# Patient Record
Sex: FEMALE | Race: WHITE | NOT HISPANIC OR LATINO | Employment: FULL TIME | URBAN - METROPOLITAN AREA
[De-identification: names, ages, dates, MRNs, and addresses within clinical notes are randomized per-mention and may not be internally consistent; named-entity substitution may affect disease eponyms.]

---

## 2017-11-07 ENCOUNTER — TELEPHONE (OUTPATIENT)
Dept: OBSTETRICS AND GYNECOLOGY | Facility: CLINIC | Age: 52
End: 2017-11-07

## 2017-11-07 NOTE — TELEPHONE ENCOUNTER
Pt called to make a new pt appt. States that she was referred to Dr. Velázquez by her friend Loly Carlisle, who is an ultrasound tech.   Pt states that it has been several years since she has had a pap and a mammogram. Pt states that she has in the past had a lumpectomy and that for the past couple of months she has been having pains in that breast near the site and that there is a lump there as well but it has always been there since lumpectomy.   Pt was given first available new pt appt for 01/31/2018.   I advised pt that I would send a note to  to see if this was ok or needed a sooner appt.  Pt was very appreciative of that.  Please advise. thanks

## 2018-01-31 ENCOUNTER — OFFICE VISIT (OUTPATIENT)
Dept: OBSTETRICS AND GYNECOLOGY | Age: 53
End: 2018-01-31

## 2018-01-31 VITALS
DIASTOLIC BLOOD PRESSURE: 72 MMHG | BODY MASS INDEX: 24.52 KG/M2 | SYSTOLIC BLOOD PRESSURE: 108 MMHG | HEIGHT: 68 IN | WEIGHT: 161.8 LBS

## 2018-01-31 DIAGNOSIS — Z12.4 SCREENING FOR MALIGNANT NEOPLASM OF CERVIX: ICD-10-CM

## 2018-01-31 DIAGNOSIS — N95.2 VAGINAL ATROPHY: ICD-10-CM

## 2018-01-31 DIAGNOSIS — Z13.89 SCREENING FOR BLOOD OR PROTEIN IN URINE: ICD-10-CM

## 2018-01-31 DIAGNOSIS — Z12.11 SCREENING FOR COLON CANCER: ICD-10-CM

## 2018-01-31 DIAGNOSIS — Z01.411 ENCOUNTER FOR GYNECOLOGICAL EXAMINATION WITH ABNORMAL FINDING: Primary | ICD-10-CM

## 2018-01-31 DIAGNOSIS — N63.11 BREAST LUMP ON RIGHT SIDE AT 11 O'CLOCK POSITION: ICD-10-CM

## 2018-01-31 LAB
BILIRUB BLD-MCNC: NEGATIVE MG/DL
CLARITY, POC: CLEAR
COLOR UR: YELLOW
GLUCOSE UR STRIP-MCNC: NEGATIVE MG/DL
KETONES UR QL: NEGATIVE
LEUKOCYTE EST, POC: NEGATIVE
NITRITE UR-MCNC: NEGATIVE MG/ML
PH UR: 7 [PH] (ref 5–8)
PROT UR STRIP-MCNC: NEGATIVE MG/DL
RBC # UR STRIP: NEGATIVE /UL
SP GR UR: 1.01 (ref 1–1.03)
UROBILINOGEN UR QL: NORMAL

## 2018-01-31 PROCEDURE — 99213 OFFICE O/P EST LOW 20 MIN: CPT | Performed by: OBSTETRICS & GYNECOLOGY

## 2018-01-31 PROCEDURE — 99386 PREV VISIT NEW AGE 40-64: CPT | Performed by: OBSTETRICS & GYNECOLOGY

## 2018-01-31 PROCEDURE — 81002 URINALYSIS NONAUTO W/O SCOPE: CPT | Performed by: OBSTETRICS & GYNECOLOGY

## 2018-01-31 RX ORDER — ESTRADIOL 0.1 MG/G
1 CREAM VAGINAL 2 TIMES WEEKLY
Qty: 42.5 G | Refills: 12 | Status: SHIPPED | OUTPATIENT
Start: 2018-02-01 | End: 2019-02-04 | Stop reason: CLARIF

## 2018-01-31 NOTE — PROGRESS NOTES
"Jorge العلي is a 52 y.o. female pt here for annual - describes pain and mass in right breast - went through menopause at age 48 - no HRT  - - not currently SA     History of Present Illness    The following portions of the patient's history were reviewed and updated as appropriate: allergies, current medications, past family history, past medical history, past social history, past surgical history and problem list.    Review of Systems   Constitutional: Negative for chills, fatigue and fever.   Gastrointestinal: Negative for abdominal distention and abdominal pain.   Genitourinary: Negative for difficulty urinating, dyspareunia, menstrual problem, pelvic pain, vaginal bleeding, vaginal discharge and vaginal pain.   Musculoskeletal:        Right breast lump    All other systems reviewed and are negative.  /72  Ht 172.7 cm (68\")  Wt 73.4 kg (161 lb 12.8 oz)  BMI 24.6 kg/m2      Objective   Physical Exam   Constitutional: She is oriented to person, place, and time. She appears well-developed and well-nourished.   Neck: Normal range of motion. Neck supple. No thyromegaly present.   Cardiovascular: Normal rate and regular rhythm.    Pulmonary/Chest: Effort normal and breath sounds normal. Right breast exhibits inverted nipple, mass and tenderness. Right breast exhibits no nipple discharge and no skin change. Left breast exhibits no mass, no nipple discharge, no skin change and no tenderness.       2cm mobile nodule vs dense breast tissue vs scar tissue from biopsy    Abdominal: Soft. Bowel sounds are normal. She exhibits no distension. There is no tenderness.   Genitourinary: Vagina normal and uterus normal. Pelvic exam was performed with patient supine. Uterus is not tender. Cervix exhibits no friability. Right adnexum displays no mass and no tenderness. Left adnexum displays no mass and no tenderness. No vaginal discharge found.   Genitourinary Comments: Pale vaginal tissue c/w atrophy  "   Musculoskeletal: Normal range of motion. She exhibits no edema.   Neurological: She is alert and oriented to person, place, and time.   Skin: Skin is warm and dry. No rash noted.   Psychiatric: She has a normal mood and affect. Her behavior is normal.   Nursing note and vitals reviewed.        Assessment/Plan   Jennifer was seen today for establish care and gynecologic exam.    Diagnoses and all orders for this visit:    Encounter for gynecological examination with abnormal finding    Screening for blood or protein in urine  -     POC Urinalysis Dipstick    Screening for malignant neoplasm of cervix  -     IgP, Aptima HPV - ThinPrep Vial, Cervix    Breast lump on right side at 11 o'clock position  -     Mammo Diagnostic Bilateral With CAD; Future    Vaginal atrophy  -     estradiol (ESTRACE VAGINAL) 0.1 MG/GM vaginal cream; Insert 1 g into the vagina 2 (Two) Times a Week.    Screening for colon cancer  -     Ambulatory Referral For Screening Colonoscopy      Counseling was given to patient for the following topics: self-breast exams .

## 2018-02-02 ENCOUNTER — TELEPHONE (OUTPATIENT)
Dept: OBSTETRICS AND GYNECOLOGY | Age: 53
End: 2018-02-02

## 2018-02-02 LAB
CYTOLOGIST CVX/VAG CYTO: NORMAL
CYTOLOGY CVX/VAG DOC THIN PREP: NORMAL
DX ICD CODE: NORMAL
HIV 1 & 2 AB SER-IMP: NORMAL
HPV I/H RISK 4 DNA CVX QL PROBE+SIG AMP: NEGATIVE
OTHER STN SPEC: NORMAL
PATH REPORT.FINAL DX SPEC: NORMAL
STAT OF ADQ CVX/VAG CYTO-IMP: NORMAL

## 2018-02-02 NOTE — TELEPHONE ENCOUNTER
----- Message from Kim Velázquez MD sent at 2/2/2018  3:06 PM EST -----  Please call patient and notify of normal results of pap smear but consistent with vaginal atrophy

## 2018-02-07 ENCOUNTER — HOSPITAL ENCOUNTER (OUTPATIENT)
Dept: ULTRASOUND IMAGING | Facility: HOSPITAL | Age: 53
Discharge: HOME | End: 2018-02-07
Attending: OBSTETRICS & GYNECOLOGY

## 2018-02-07 ENCOUNTER — HOSPITAL ENCOUNTER (OUTPATIENT)
Dept: MAMMOGRAPHY | Facility: HOSPITAL | Age: 53
Discharge: HOME | End: 2018-02-07
Attending: OBSTETRICS & GYNECOLOGY | Admitting: OBSTETRICS & GYNECOLOGY

## 2018-02-07 DIAGNOSIS — N63.11 BREAST LUMP ON RIGHT SIDE AT 11 O'CLOCK POSITION: ICD-10-CM

## 2018-02-07 PROCEDURE — 77066 DX MAMMO INCL CAD BI: CPT

## 2018-02-07 PROCEDURE — 76642 ULTRASOUND BREAST LIMITED: CPT

## 2018-02-09 ENCOUNTER — TELEPHONE (OUTPATIENT)
Dept: OBSTETRICS AND GYNECOLOGY | Age: 53
End: 2018-02-09

## 2018-02-09 DIAGNOSIS — R92.8 BI-RADS CATEGORY 3 MAMMOGRAM RESULT: Primary | ICD-10-CM

## 2018-02-09 NOTE — TELEPHONE ENCOUNTER
----- Message from Kim Velázquez MD sent at 2/9/2018 11:29 AM EST -----  Please call the patient regarding her normal mammogram of left breast and and right breast as probably benign but needs right breast US in 6 months - order in

## 2018-02-09 NOTE — PROGRESS NOTES
Please call the patient regarding her normal mammogram of left breast and and right breast as probably benign but needs right breast US in 6 months - order in

## 2018-08-09 ENCOUNTER — HOSPITAL ENCOUNTER (OUTPATIENT)
Dept: ULTRASOUND IMAGING | Facility: HOSPITAL | Age: 53
Discharge: HOME | End: 2018-08-09
Attending: OBSTETRICS & GYNECOLOGY | Admitting: OBSTETRICS & GYNECOLOGY

## 2018-08-09 DIAGNOSIS — R92.8 BI-RADS CATEGORY 3 MAMMOGRAM RESULT: ICD-10-CM

## 2018-08-09 PROCEDURE — 76642 ULTRASOUND BREAST LIMITED: CPT

## 2018-08-14 ENCOUNTER — TELEPHONE (OUTPATIENT)
Dept: OBSTETRICS AND GYNECOLOGY | Age: 53
End: 2018-08-14

## 2018-08-14 DIAGNOSIS — N63.11 BREAST LUMP ON RIGHT SIDE AT 11 O'CLOCK POSITION: Primary | ICD-10-CM

## 2018-08-14 NOTE — TELEPHONE ENCOUNTER
Called to discuss mgm results - left detailed voicemail regarding right breast biopsy results and need for biopsy

## 2018-10-25 ENCOUNTER — APPOINTMENT (OUTPATIENT)
Dept: ULTRASOUND IMAGING | Facility: HOSPITAL | Age: 53
End: 2018-10-25
Attending: OBSTETRICS & GYNECOLOGY

## 2019-02-04 ENCOUNTER — OFFICE VISIT (OUTPATIENT)
Dept: OBSTETRICS AND GYNECOLOGY | Age: 54
End: 2019-02-04

## 2019-02-04 VITALS
BODY MASS INDEX: 23.95 KG/M2 | DIASTOLIC BLOOD PRESSURE: 70 MMHG | WEIGHT: 158 LBS | SYSTOLIC BLOOD PRESSURE: 110 MMHG | HEIGHT: 68 IN

## 2019-02-04 DIAGNOSIS — Z12.11 ENCOUNTER FOR SCREENING COLONOSCOPY: ICD-10-CM

## 2019-02-04 DIAGNOSIS — N95.2 VAGINAL ATROPHY: ICD-10-CM

## 2019-02-04 DIAGNOSIS — Z12.4 SCREENING FOR MALIGNANT NEOPLASM OF CERVIX: ICD-10-CM

## 2019-02-04 DIAGNOSIS — Z12.31 BREAST CANCER SCREENING BY MAMMOGRAM: ICD-10-CM

## 2019-02-04 DIAGNOSIS — N63.11 LUMP IN UPPER OUTER QUADRANT OF RIGHT BREAST: ICD-10-CM

## 2019-02-04 DIAGNOSIS — Z01.411 ENCOUNTER FOR GYNECOLOGICAL EXAMINATION WITH ABNORMAL FINDING: Primary | ICD-10-CM

## 2019-02-04 PROBLEM — R92.8 BI-RADS CATEGORY 3 MAMMOGRAM RESULT: Status: RESOLVED | Noted: 2018-02-09 | Resolved: 2019-02-04

## 2019-02-04 PROCEDURE — 99396 PREV VISIT EST AGE 40-64: CPT | Performed by: OBSTETRICS & GYNECOLOGY

## 2019-02-04 NOTE — PROGRESS NOTES
"Jorge العلي is a 53 y.o. female annual visit. last pap 01/31/18 neg / last R breast us 08/09/18 / pt never started estrace due to price - discussed trying imvexxy - would like to try.  Due for mgm/breast US for persistent stable right breast lump at previous lumpectomy site. Did not get colonoscopy ordered last year.       History of Present Illness    The following portions of the patient's history were reviewed and updated as appropriate: allergies, current medications, past family history, past medical history, past social history, past surgical history and problem list.    Review of Systems   Constitutional: Negative for chills and fever.   Gastrointestinal: Negative for abdominal distention and abdominal pain.   Genitourinary: Negative for dyspareunia, dysuria, pelvic pain, vaginal bleeding, vaginal discharge and vaginal pain.   All other systems reviewed and are negative.    /70   Ht 172.7 cm (68\")   Wt 71.7 kg (158 lb)   LMP  (LMP Unknown)   BMI 24.02 kg/m²     Objective   Physical Exam   Constitutional: She is oriented to person, place, and time. She appears well-developed and well-nourished.   Neck: Normal range of motion. Neck supple. No thyromegaly present.   Cardiovascular: Normal rate and regular rhythm.   Pulmonary/Chest: Effort normal and breath sounds normal. Right breast exhibits mass. Right breast exhibits no nipple discharge, no skin change and no tenderness. Left breast exhibits no mass, no nipple discharge, no skin change and no tenderness.   2 cm mobile mass in UOQ right breast        Abdominal: Soft. Bowel sounds are normal. She exhibits no distension. There is no tenderness.   Genitourinary: Uterus normal. Pelvic exam was performed with patient supine. Uterus is not tender. Cervix exhibits no friability. Right adnexum displays no mass and no tenderness. Left adnexum displays no mass and no tenderness. No vaginal discharge found.   Genitourinary Comments: Pale vaginal " tissue c/w atrophy .   Musculoskeletal: Normal range of motion. She exhibits no edema.   Neurological: She is alert and oriented to person, place, and time.   Skin: Skin is warm and dry. No rash noted.   Psychiatric: She has a normal mood and affect. Her behavior is normal.   Nursing note and vitals reviewed.        Assessment/Plan   [place] was seen today for gynecologic exam.    Diagnoses and all orders for this visit:    Encounter for gynecological examination with abnormal finding    Screening for malignant neoplasm of cervix  -     IgP, Aptima HPV    Vaginal atrophy  -     Estradiol (IMVEXXY MAINTENANCE PACK) 10 MCG insert; Insert 1 capsule into the vagina 2 (Two) Times a Week.    Breast cancer screening by mammogram  -     Cancel: Mammo Screening Bilateral With CAD    Lump in upper outer quadrant of right breast  -     US breast right complete; Future  -     Mammo Diagnostic Bilateral With CAD    Encounter for screening colonoscopy  -     Ambulatory Referral For Screening Colonoscopy      Counseling was given to patient for the following topics: self-breast exams .

## 2019-02-06 ENCOUNTER — TELEPHONE (OUTPATIENT)
Dept: OBSTETRICS AND GYNECOLOGY | Age: 54
End: 2019-02-06

## 2019-02-06 NOTE — TELEPHONE ENCOUNTER
----- Message from Kim Velázquez MD sent at 2/6/2019  9:42 AM EST -----  Please call patient and notify of normal results of pap smear

## 2019-02-13 ENCOUNTER — HOSPITAL ENCOUNTER (OUTPATIENT)
Dept: MAMMOGRAPHY | Facility: HOSPITAL | Age: 54
End: 2019-02-13
Attending: OBSTETRICS & GYNECOLOGY

## 2019-02-13 ENCOUNTER — APPOINTMENT (OUTPATIENT)
Dept: ULTRASOUND IMAGING | Facility: HOSPITAL | Age: 54
End: 2019-02-13
Attending: OBSTETRICS & GYNECOLOGY

## 2019-02-25 ENCOUNTER — APPOINTMENT (OUTPATIENT)
Dept: ULTRASOUND IMAGING | Facility: HOSPITAL | Age: 54
End: 2019-02-25
Attending: OBSTETRICS & GYNECOLOGY

## 2019-02-27 ENCOUNTER — HOSPITAL ENCOUNTER (OUTPATIENT)
Dept: ULTRASOUND IMAGING | Facility: HOSPITAL | Age: 54
End: 2019-02-27
Attending: OBSTETRICS & GYNECOLOGY

## 2019-03-07 ENCOUNTER — TELEPHONE (OUTPATIENT)
Dept: OBSTETRICS AND GYNECOLOGY | Age: 54
End: 2019-03-07

## 2019-03-07 NOTE — TELEPHONE ENCOUNTER
SPOKE WITH PT SEVERAL TIMES. ORIGINALLY SHE WAS TO HAVE A R DIAG MAMMO  AND R BREAST U/S DONE BUT SHE DID NOT WANT TO HAVE THE DIAG MAMMO AS SHE DOES NOT LIKE THEM. HAD HER SCHEDULED AT [facility] BUT DR. MARCOS WOULD NOT DO A BREAST U/S ONLY HE STATES PT HAS TO HAVE DIAG W A U/S. PT DECIDED TO GO TO [facility] AS THEY WILL DO A BREAST U/S ONLY. I HAD HER SCHEDULED AT [facility] 2/27/19. CALLED TO F/U AND PT CX THAT  AS IT WAS $1100 FOR THE U/S . PER THE PT SHE WOULD LIKE TO HOLD OFF ON ALL OF THIS. DIAG MAMMO AND U/S OF R BREAST.  NADIA

## 2019-03-29 NOTE — TELEPHONE ENCOUNTER
*LAY* Pt called back to schedule her diag mammo and U/S, she wants to go to [facility], [place] and will set her appt there.

## 2019-05-30 ENCOUNTER — HOSPITAL ENCOUNTER (OUTPATIENT)
Dept: MAMMOGRAPHY | Facility: HOSPITAL | Age: 54
Discharge: HOME | End: 2019-05-30
Admitting: OBSTETRICS & GYNECOLOGY

## 2019-05-30 ENCOUNTER — HOSPITAL ENCOUNTER (OUTPATIENT)
Dept: ULTRASOUND IMAGING | Facility: HOSPITAL | Age: 54
Discharge: HOME | End: 2019-05-30

## 2019-05-30 DIAGNOSIS — N63.11 LUMP IN UPPER OUTER QUADRANT OF RIGHT BREAST: ICD-10-CM

## 2019-05-30 PROCEDURE — 76642 ULTRASOUND BREAST LIMITED: CPT

## 2019-05-30 PROCEDURE — 77066 DX MAMMO INCL CAD BI: CPT

## 2019-06-03 DIAGNOSIS — N63.11 LUMP IN UPPER OUTER QUADRANT OF RIGHT BREAST: Primary | ICD-10-CM

## 2019-11-29 ENCOUNTER — HOSPITAL ENCOUNTER (OUTPATIENT)
Dept: ULTRASOUND IMAGING | Facility: HOSPITAL | Age: 54
Discharge: HOME | End: 2019-11-29
Admitting: OBSTETRICS & GYNECOLOGY

## 2019-11-29 DIAGNOSIS — N63.11 LUMP IN UPPER OUTER QUADRANT OF RIGHT BREAST: ICD-10-CM

## 2019-11-29 PROCEDURE — 76642 ULTRASOUND BREAST LIMITED: CPT

## 2019-12-03 DIAGNOSIS — N63.11 LUMP IN UPPER OUTER QUADRANT OF RIGHT BREAST: Primary | ICD-10-CM

## 2019-12-03 NOTE — PROGRESS NOTES
Please call the patient regarding her abnormal result.Slightly irregular and heterogeneous 9 mm nodule in the 11-o'clock  position of the right breast, stable since the previous ultrasound  studies as described.  2. Additional follow-up ultrasound of this area in approximately 6  months recommended.-

## 2020-04-09 ENCOUNTER — TELEPHONE (OUTPATIENT)
Dept: FAMILY MEDICINE CLINIC | Facility: CLINIC | Age: 55
End: 2020-04-09

## 2020-04-09 ENCOUNTER — OFFICE VISIT (OUTPATIENT)
Dept: FAMILY MEDICINE CLINIC | Facility: CLINIC | Age: 55
End: 2020-04-09

## 2020-04-09 ENCOUNTER — HOSPITAL ENCOUNTER (OUTPATIENT)
Dept: GENERAL RADIOLOGY | Facility: HOSPITAL | Age: 55
Discharge: HOME | End: 2020-04-09
Admitting: FAMILY MEDICINE

## 2020-04-09 VITALS
HEART RATE: 81 BPM | WEIGHT: 166 LBS | SYSTOLIC BLOOD PRESSURE: 160 MMHG | BODY MASS INDEX: 25.16 KG/M2 | OXYGEN SATURATION: 98 % | DIASTOLIC BLOOD PRESSURE: 90 MMHG | HEIGHT: 68 IN | TEMPERATURE: 98.2 F

## 2020-04-09 DIAGNOSIS — S93.421A SPRAIN OF DELTOID LIGAMENT OF RIGHT ANKLE, INITIAL ENCOUNTER: ICD-10-CM

## 2020-04-09 DIAGNOSIS — S82.891A CLOSED AVULSION FRACTURE OF RIGHT ANKLE, INITIAL ENCOUNTER: Primary | ICD-10-CM

## 2020-04-09 DIAGNOSIS — I10 ESSENTIAL HYPERTENSION: Primary | ICD-10-CM

## 2020-04-09 PROCEDURE — 73610 X-RAY EXAM OF ANKLE: CPT

## 2020-04-09 PROCEDURE — 99203 OFFICE O/P NEW LOW 30 MIN: CPT | Performed by: FAMILY MEDICINE

## 2020-04-09 RX ORDER — OLMESARTAN MEDOXOMIL 20 MG/1
20 TABLET ORAL DAILY
Qty: 30 TABLET | Refills: 1 | Status: SHIPPED | OUTPATIENT
Start: 2020-04-09 | End: 2020-04-16 | Stop reason: CLARIF

## 2020-04-09 NOTE — PROGRESS NOTES
Subjective   Jennifer العلي is a 54 y.o. female with   Chief Complaint   Patient presents with   • Foot Pain     Right   • Hypertension   .    History of Present Illness   54-year-old white female with first visit to this office with a couple different medical issues for further evaluation.  Health questionnaire was completed in its entirety and reviewed on this date.  Patient with complaint of right ankle pain following an inversion injury that occurred 2 weeks ago.  She apparently was walking in her yard wearing flip-flops that had a platform sole.  She apparently stepped into a hole and inverted her right ankle.  She said she heard a pop.  She immediately had pain in the lateral portion of the ankle with marked swelling and ecchymosis both laterally and medially.  She immediately wrapped her foot and has kept a brace on it ever since.  She also initially elevated her leg with compression and ice in place.  She has been attempting to walk with ankle brace and finds this still somewhat difficult and she feels unstable.  She has had no past history of similar injury and right ankle in general has been intact without problems.  She also notes increase blood pressure that she states she has been watching over the last 2 years.  There is a strong family history of hypertension in both mother and several siblings.  Patient herself has never been on antihypertensives.  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  She most recently however has a nurse friend who brought a manual cuff over and was getting readings of 150 over upper 80s and on another separate occasion 150/90.  She has felt somewhat lightheaded during these episodes and has been able to tell that her blood pressure has been elevated.  The following portions of the patient's history were reviewed and updated as appropriate: allergies, current medications, past family history, past  medical history, past social history, past surgical history and problem list.    Review of Systems   Cardiovascular: Negative for chest pain, palpitations and leg swelling.        Elevated blood pressure   Musculoskeletal: Positive for arthralgias and gait problem.       Objective     Vitals:    04/09/20 1429   BP: 160/90   Pulse: 81   Temp: 98.2 °F (36.8 °C)   SpO2: 98%       No results found for this or any previous visit (from the past 672 hour(s)).    Physical Exam   Constitutional: She is oriented to person, place, and time. She appears well-developed and well-nourished.   HENT:   Head: Normocephalic and atraumatic.   Neck: Trachea normal and phonation normal. Neck supple. Normal carotid pulses present. Carotid bruit is not present. No thyroid mass and no thyromegaly present.   Cardiovascular: Normal rate, regular rhythm and normal heart sounds. Exam reveals no gallop and no friction rub.   No murmur heard.  Pulmonary/Chest: Effort normal and breath sounds normal. No respiratory distress. She has no decreased breath sounds. She has no wheezes. She has no rhonchi. She has no rales.   Musculoskeletal:   Right ankle is observed with minimal soft tissue swelling in the posterior aspect of the right lateral malleolus.  Point of maximum tenderness is at the most distal aspect of the lateral malleolus.  There is observed ecchymosis in the inferior portion of the right lateral and medial foot region.  No evidence of tenderness with palpation along fourth and fifth metatarsals.  Range of motion is near full and relatively free of pain.  Motor 5/5 and neurovascular intact distally.   Lymphadenopathy:     She has no cervical adenopathy.   Neurological: She is alert and oriented to person, place, and time. She has normal strength. No sensory deficit.   Skin: Skin is warm and dry. Ecchymosis noted. No rash noted.   Psychiatric: She has a normal mood and affect. Her speech is normal and behavior is normal. Judgment and  thought content normal. Cognition and memory are normal.   Nursing note and vitals reviewed.      Assessment/Plan   Jennifer was seen today for foot pain and hypertension.    Diagnoses and all orders for this visit:    Essential hypertension  -     olmesartan (BENICAR) 20 MG tablet; Take 1 tablet by mouth Daily.    Sprain of deltoid ligament of right ankle, initial encounter  -     XR Ankle 3+ View Right        Return in about 4 weeks (around 5/7/2020).

## 2020-04-10 ENCOUNTER — TELEPHONE (OUTPATIENT)
Dept: ORTHOPEDIC SURGERY | Facility: CLINIC | Age: 55
End: 2020-04-10

## 2020-04-10 NOTE — TELEPHONE ENCOUNTER
Cande with PCP Dr. Naga Elder's office called to refer patient for IOV / RIGHT Ankle, Lateral Malleolus FX / DOI March 2020 / XR 4/09/20 (Epic) - when would MWM like to see? Cande at Dr. Elder's office can be reached at [phone] & patient can be reached at [phone]. Thanks / srh

## 2020-04-10 NOTE — TELEPHONE ENCOUNTER
Left message for patient to call [facility] / [facility] as soon as possible.     If / when patient calls, can transfer to [facility] or inform patient that MWM will see this coming Mon 4/13 at 0830 (8:15 arrival) for IOV / RIGHT Ankle, Lateral Malleolus FX / DOI March 2020 / XR 4/09/20 (Epic)     Need to give patient appt date / time & [facility] address as well as registration info to bring.     Sent add-on email to PADMINI to add-on appt.     Notified Cande at Dr. Naga Elder's office that MWM will see patient this coming Mon 4/13 @ 0830. Cande appreciated the call.

## 2020-04-13 ENCOUNTER — OFFICE VISIT (OUTPATIENT)
Dept: ORTHOPEDIC SURGERY | Facility: CLINIC | Age: 55
End: 2020-04-13

## 2020-04-13 VITALS — TEMPERATURE: 98.1 F | HEIGHT: 68 IN | BODY MASS INDEX: 25.16 KG/M2 | WEIGHT: 166 LBS

## 2020-04-13 DIAGNOSIS — S93.401A SPRAIN OF RIGHT ANKLE, UNSPECIFIED LIGAMENT, INITIAL ENCOUNTER: ICD-10-CM

## 2020-04-13 DIAGNOSIS — S82.64XA CLOSED NONDISPLACED FRACTURE OF LATERAL MALLEOLUS OF RIGHT FIBULA, INITIAL ENCOUNTER: Primary | ICD-10-CM

## 2020-04-13 DIAGNOSIS — M76.72 PERONEAL TENDONITIS, LEFT: ICD-10-CM

## 2020-04-13 PROCEDURE — 27786 TREATMENT OF ANKLE FRACTURE: CPT | Performed by: ORTHOPAEDIC SURGERY

## 2020-04-13 PROCEDURE — 99244 OFF/OP CNSLTJ NEW/EST MOD 40: CPT | Performed by: ORTHOPAEDIC SURGERY

## 2020-04-13 RX ORDER — MULTIPLE VITAMINS W/ MINERALS TAB 9MG-400MCG
1 TAB ORAL DAILY
COMMUNITY

## 2020-04-13 RX ORDER — ASCORBIC ACID 500 MG
500 TABLET ORAL DAILY
COMMUNITY

## 2020-04-13 NOTE — PROGRESS NOTES
New Patient Complaint      Patient: Jennifer العلي  YOB: 1965 54 y.o. female  Medical Record Number: [id number]    Chief Complaints: I hurt my ankle    History of Present Illness:    Patient injured her right ankle on 3/28/2020 when she was on some elevated flip-flops and stepped in a hole in her yard twisting her right ankle and felt and heard a pop.  Prior to that she had no complaints or prior injury in the right ankle.  She had persistent complaints of pain and was seen by her PCP and sent for an x-ray that she had last week and is here today for further evaluation.  She been using a Velcro wrap around her ankle and reports mild intermittent stabbing pain with bruising and swelling worse with walking.    She is seen today at the request of Dr. Elder who has requested my opinion regarding etiology and treatment of this condition.    HPI    Allergies: No Known Allergies    Medications:   Current Outpatient Medications on File Prior to Visit   Medication Sig   • Multiple Vitamins-Minerals (MULTIVITAMIN WITH MINERALS) tablet tablet Take 1 tablet by mouth Daily.   • vitamin C (ASCORBIC ACID) 500 MG tablet Take 500 mg by mouth Daily.   • olmesartan (BENICAR) 20 MG tablet Take 1 tablet by mouth Daily.     No current facility-administered medications on file prior to visit.        Past Medical History:   Diagnosis Date   • Ovarian cyst    • Thrombosis     30 years ago - hematoma     Past Surgical History:   Procedure Laterality Date   • BREAST BIOPSY     • BREAST LUMPECTOMY     •  SECTION      2x   • MOUTH SURGERY       Social History     Occupational History   • Not on file   Tobacco Use   • Smoking status: Never Smoker   • Smokeless tobacco: Never Used   Substance and Sexual Activity   • Alcohol use: No   • Drug use: No   • Sexual activity: Not Currently      Social History     Social History Narrative   • Not on file     Family History   Problem Relation Age of Onset   • Hypertension Father    •  "Heart failure Father    • Breast cancer Mother 75   • Mental illness Brother    • Ovarian cancer Neg Hx    • Uterine cancer Neg Hx    • Colon cancer Neg Hx        Review of Systems: 14 point review of systems performed, positive pertinent findings identified in HPI. All remaining systems negative     Review of Systems      Physical Exam:   Vitals:    04/13/20 0832   Temp: 98.1 °F (36.7 °C)   TempSrc: Temporal   Weight: 75.3 kg (166 lb)   Height: 172.7 cm (68\")   PainSc:   3   PainLoc: Ankle     Physical Exam   Constitutional: pleasant, well developed   Eyes: sclera non icteric  Hearing : adequate for exam  Cardiovascular: palpable pulses in right foot, right calf/ thigh NT without sign of DVT  Respiratoy: breathing unlabored   Neurological: grossly sensate to LT throughout right LE  Psychiatric: oriented with normal mood and affect.   Lymphatic: No palpable popliteal lymphadenopathy right LE  Skin: intact throughout right leg/foot  Musculoskeletal: Right ankle shows mild swelling with tenderness to palpation over the distal aspect of the fibula.  There is mild discomfort with anterolateral ligamentous structures as well as over the medial deltoid and mild discomfort on the peroneal tendons in the retromalleolar groove but no evidence of subluxation on exam today.  Nontender over the dorsum of the midfoot.  No varus instability noted  Physical Exam  Ortho Exam    Radiology: 3 views of the right ankle reviewed with her on the [employer] system from 4/9/2020 which shows a avulsion fracture of the distal aspect of the fibula measuring approximately 11 mm x 3 mm with 1 to 2 mm of separation from the distal fibula.  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  No widening of the syndesmosis or evidence of other fracture.    Assessment/Plan: 1.  Right distal fibular fracture at the insertion of the " anterolateral ligamentous complex  2.  Right ankle mild medial deltoid sprain  3.  Right ankle possible peroneal tendon injury with tendinitis    We reviewed treatment options and decided to hold off on an MRI as she is only having very mild symptoms on the peroneal tendons and deltoid which are improving but if symptoms persist may require MRI.    Reviewed treatment options with her regarding her distal fibula fracture and would not recommend any surgical treatment at this time reviewed with her that the majority of time these heal without surgical treatment or at least become asymptomatic with fibrous nonunion and if she has persistent problems would require excision of this fragment and secondary ligament reconstruction.    She was counseled avoid taking oral anti-inflammatories such as Naprosyn Aleve ibuprofen etc. so as not to inhibit bone healing but Tylenol is okay.    For now going to have her use a boot for protected weightbearing with a cane in the contralateral hand (she says she can get a cane from a family member) and understands uses in the contralateral hand.    She will sleep in air stirrup brace was provided today    I will see her back in 2 weeks with x-rays of her right ankle.

## 2020-04-16 ENCOUNTER — TELEPHONE (OUTPATIENT)
Dept: FAMILY MEDICINE CLINIC | Facility: CLINIC | Age: 55
End: 2020-04-16

## 2020-04-16 DIAGNOSIS — I10 ESSENTIAL HYPERTENSION: Primary | ICD-10-CM

## 2020-04-16 RX ORDER — LISINOPRIL 20 MG/1
20 TABLET ORAL DAILY
Qty: 30 TABLET | Refills: 1 | Status: SHIPPED | OUTPATIENT
Start: 2020-04-16 | End: 2020-06-08 | Stop reason: SINTOL

## 2020-04-16 NOTE — TELEPHONE ENCOUNTER
PATIENT CALLED STATING THAT SHE HAD AN APPOINTMENT WITH DR. GELLER LAST THURSDAY (4/9/20) AND HE PRESCRIBED HER THE OLMESARTAN (BENICAR) 20 MG TABLET TO MANAGE HER BLOOD PRESSURE.    THE PATIENT STATED THAT SHE RECEIVED A PHONE CALL FROM SOMEONE AT THE PHARMACY AT [facility] ON THURSDAY (4/9/20) AND THEY INFORMED HER THAT HER INSURANCE WOULD NOT COVER THIS MEDICATION WITHOUT HER TRYING OTHER MEDICATIONS FIRST OR WITHOUT DOCUMENTATION FOR WHY SHE IS SKIPPING OTHER MEDICATIONS BEFORE TRYING THIS ONE.     THE PATIENT STATED THAT SHE THEN CALLED HER INSURANCE COMPANY (PER THE ADVICE OF THE INDIVIDUAL THAT SHE SPOKE WITH AT THE PHARMACY), AND THEY ADVISED HER THAT THEY WOULD ACCEPT AN ACE OR ACE COMBO, OR A FORMULARY GENERIC ARB OR ARB COMBO.    THE PATIENT STATED THAT THE PERSON THAT SHE SPOKE WITH AT THE PHARMACY INFORMED HER THAT THEY WOULD BE SENDING THIS INFORMATION OVER TO DR. GELLER'S OFFICE SO THAT WHATEVER ACTION IS NECESSARY CAN BE MADE FOR HER TO RECEIVE SOME KIND OF BLOOD PRESSURE MEDICATION.    THE PATIENT STATED THAT SHE WAS JUST CALLING TO CHECK ON THE STATUS OF THIS AND SEE IF ANY PROGRESS HAD BEEN MADE.    I CONFIRMED THE CORRECT PHARMACY WITH THE PATIENT TO BE [facility] ON [address] (PHONE NUMBER: [phone]).    PLEASE CALL THE PATIENT [phone] WHEN THIS MESSAGE HAS BEEN RECEIVED AND ADVISE HER REGARDING THE STATUS OF ABOVE REQUEST/CONCERN.

## 2020-04-16 NOTE — TELEPHONE ENCOUNTER
Please tell her that I have changed the medication to a product that she will take 1/day.  If she develops a incessant hacking dry cough call us and then we can try the other product.

## 2020-04-27 ENCOUNTER — OFFICE VISIT (OUTPATIENT)
Dept: ORTHOPEDIC SURGERY | Facility: CLINIC | Age: 55
End: 2020-04-27

## 2020-04-27 VITALS — TEMPERATURE: 98.7 F | WEIGHT: 162 LBS | HEIGHT: 68 IN | BODY MASS INDEX: 24.55 KG/M2

## 2020-04-27 DIAGNOSIS — S82.64XD CLOSED NONDISPLACED FRACTURE OF LATERAL MALLEOLUS OF RIGHT FIBULA WITH ROUTINE HEALING, SUBSEQUENT ENCOUNTER: Primary | ICD-10-CM

## 2020-04-27 DIAGNOSIS — M76.72 PERONEAL TENDONITIS, LEFT: ICD-10-CM

## 2020-04-27 DIAGNOSIS — S93.421D SPRAIN OF DELTOID LIGAMENT OF RIGHT ANKLE, SUBSEQUENT ENCOUNTER: ICD-10-CM

## 2020-04-27 PROBLEM — S93.421A SPRAIN OF DELTOID LIGAMENT OF RIGHT ANKLE: Status: ACTIVE | Noted: 2020-04-27

## 2020-04-27 PROCEDURE — 99213 OFFICE O/P EST LOW 20 MIN: CPT | Performed by: ORTHOPAEDIC SURGERY

## 2020-04-27 PROCEDURE — 73610 X-RAY EXAM OF ANKLE: CPT | Performed by: ORTHOPAEDIC SURGERY

## 2020-04-27 NOTE — PROGRESS NOTES
"Ankle Follow Up      Patient: Jennifer العلي    YOB: 1965 54 y.o. female    Chief Complaints: Ankle feels some better    History of Present Illness: Patient follows up injured her right ankle that occurred on 3/28/2020 with initial evaluation on 4/13/2020.  This occurred when she was on some elevated flip-flops and stepped in a hole in her yard twisting her right ankle and feeling a pop.    At our visit she was found to have a distal fibular avulsion fracture I felt that medial deltoid sprain with peroneal tendinitis.  We decided to hold off on MRI at that time as it would not change our treatment protocol.  She was fitted with a boot for protected weightbearing and use of the contralateral cane and to sleep in an air stirrup.    She is seen back today reporting that her ankle pain is improved but not resolved with mild aching pain in the inferolateral more so the medial aspect of the right ankle.  HPI    ROS: ankle pain  Past Medical History:   Diagnosis Date   • Ovarian cyst    • Thrombosis     30 years ago - hematoma       Physical Exam:   Vitals:    04/27/20 1428   Temp: 98.7 °F (37.1 °C)   TempSrc: Temporal   Weight: 73.5 kg (162 lb)   Height: 172.7 cm (68\")   PainSc: 0-No pain   PainLoc: Ankle     Well developed with normal mood.  Right ankle shows mild tenderness over the tip of the fibula and anterolateral ligamentous complex with minimal discomfort along the peroneal tendons with much if any swelling in very slight discomfort medial deltoid but grossly stable to varus and valgus stress and anterior drawer but with some guarding.      Radiology: 3 views of the right ankle ordered evaluate pain and alignment reviewed and compared with prior x-rays there remains the avulsion fracture of the distal fibula without change in alignment compared with previous x-rays.  Talus remains well-seated within the mortise      Assessment/Plan:  1.  Right distal fibular fracture at the insertion of the anterolateral " ligamentous complex  2.  Right ankle mild medial deltoid sprain  3.  Right ankle possible peroneal tendon injury with tendinitis    As symptoms are improving we decided to hold off on MRI at this time and will have her transition out of her boot to an ASO brace but continue use of her cane.  She will begin some gentle dorsiflexion plantarflexion exercises for the next 10 to 14 days may then begin some inversion eversion exercises.    I will see her back in 3 weeks x-rays of her right ankle if she is doing well he may start some therapy.

## 2020-05-18 ENCOUNTER — OFFICE VISIT (OUTPATIENT)
Dept: ORTHOPEDIC SURGERY | Facility: CLINIC | Age: 55
End: 2020-05-18

## 2020-05-18 VITALS — BODY MASS INDEX: 25.01 KG/M2 | TEMPERATURE: 97.8 F | HEIGHT: 68 IN | WEIGHT: 165 LBS

## 2020-05-18 DIAGNOSIS — S82.64XD CLOSED NONDISPLACED FRACTURE OF LATERAL MALLEOLUS OF RIGHT FIBULA WITH ROUTINE HEALING, SUBSEQUENT ENCOUNTER: ICD-10-CM

## 2020-05-18 DIAGNOSIS — S93.421D SPRAIN OF DELTOID LIGAMENT OF RIGHT ANKLE, SUBSEQUENT ENCOUNTER: ICD-10-CM

## 2020-05-18 DIAGNOSIS — S86.301D INJURY OF PERONEAL TENDON OF RIGHT FOOT, SUBSEQUENT ENCOUNTER: Primary | ICD-10-CM

## 2020-05-18 PROCEDURE — 73610 X-RAY EXAM OF ANKLE: CPT | Performed by: ORTHOPAEDIC SURGERY

## 2020-05-18 PROCEDURE — 99024 POSTOP FOLLOW-UP VISIT: CPT | Performed by: ORTHOPAEDIC SURGERY

## 2020-05-18 NOTE — PROGRESS NOTES
"Ankle Follow Up      Patient: Jennifer العلي    YOB: 1965 54 y.o. female    Chief Complaints: Ankle pain    History of Present Illness:Patient follows up injured her right ankle that occurred on 3/28/2020 with initial evaluation on 4/13/2020.  This occurred when she was on some elevated flip-flops and stepped in a hole in her yard twisting her right ankle and feeling a pop.     At our visit she was found to have a distal fibular avulsion fracture I felt that medial deltoid sprain with peroneal tendinitis.     She was last seen on 4/27/2020 and has transitioned out of her boot into an ASO brace.  She is actually using a cane in her right hand instead of her left limiting weight on her ankle.  She said it is feeling some better but has persistent complaints of mild to moderate intermittent pain along the posterior lateral aspect of the ankle along the peroneal tendons more so than medially or anterolaterally with some discomfort anterolaterally.  HPI    ROS: ankle pain  Past Medical History:   Diagnosis Date   • Ovarian cyst    • Thrombosis     30 years ago - hematoma       Physical Exam:   Vitals:    05/18/20 1053   Temp: 97.8 °F (36.6 °C)   Weight: 74.8 kg (165 lb)   Height: 172.7 cm (68\")   PainSc: 0-No pain     Well developed with normal mood.  On exam she is using a cane in her brace.  She has mild discomfort over the tip of the fibula and mild to moderate discomfort along the peroneal tendons worse with resisted eversion but no subluxation.  I was unable to elicit any discomfort medially.  There was some excursion with anterior drawer but this seem to be fairly symmetric bilaterally.    Radiology: 3 views of the right ankle ordered evaluate alignment and fracture reviewed and compared with previous x-rays.  The avulsion fracture over the distal aspect of the fibula appears to be consolidating and I do not see any widening medially.  Talus appears well seated within the mortise      Assessment/Plan:  1. "  Right distal fibular fracture at the insertion of the anterolateral ligamentous complex  2.  Right ankle mild medial deltoid sprain  3.  Right ankle possible peroneal tendon injury with tendinitis    We discussed treatment options given her persistent complaints of pain and swelling along peroneal tendons we need to get an MRI for further evaluation as this may require surgical treatment.    Have her continue with her ASO brace and demonstrated for her proper use of her cane in the contralateral hand to help offload this.    She understands to call to schedule follow-up appointment after MRI has been scheduled in order review results in the office and determine treatment course going forward.

## 2020-05-28 ENCOUNTER — TELEPHONE (OUTPATIENT)
Dept: ORTHOPEDIC SURGERY | Facility: CLINIC | Age: 55
End: 2020-05-28

## 2020-06-01 ENCOUNTER — OFFICE VISIT (OUTPATIENT)
Dept: ORTHOPEDIC SURGERY | Facility: CLINIC | Age: 55
End: 2020-06-01

## 2020-06-01 VITALS — TEMPERATURE: 97.8 F | HEIGHT: 68 IN | WEIGHT: 165 LBS | BODY MASS INDEX: 25.01 KG/M2

## 2020-06-01 DIAGNOSIS — S86.311D TEAR OF PERONEAL TENDON, RIGHT, SUBSEQUENT ENCOUNTER: ICD-10-CM

## 2020-06-01 DIAGNOSIS — R93.7 BONE MARROW EDEMA: ICD-10-CM

## 2020-06-01 DIAGNOSIS — S93.401D SPRAIN OF RIGHT ANKLE, UNSPECIFIED LIGAMENT, SUBSEQUENT ENCOUNTER: Primary | ICD-10-CM

## 2020-06-01 DIAGNOSIS — S82.64XD CLOSED NONDISPLACED FRACTURE OF LATERAL MALLEOLUS OF RIGHT FIBULA WITH ROUTINE HEALING, SUBSEQUENT ENCOUNTER: ICD-10-CM

## 2020-06-01 PROBLEM — S86.319A TEAR OF PERONEAL TENDON: Status: ACTIVE | Noted: 2020-06-01

## 2020-06-01 PROCEDURE — 99213 OFFICE O/P EST LOW 20 MIN: CPT | Performed by: ORTHOPAEDIC SURGERY

## 2020-06-01 NOTE — PROGRESS NOTES
"Ankle Follow Up      Patient: Jennifer العلي    YOB: 1965 55 y.o. female    Chief Complaints: Ankle feels a lot better    History of Present Illness:Patient follows up injured her right ankle that occurred on 3/28/2020 with initial evaluation on 4/13/2020.  This occurred when she was on some elevated flip-flops and stepped in a hole in her yard twisting her right ankle and feeling a pop.     At our visit she was found to have a distal fibular avulsion fracture I felt that medial deltoid sprain with peroneal tendinitis    She was last seen on 5/18/2020 and was somewhat improved but had persistent complaints of mild to moderate aching pain on the posterior aspect of the ankle along the peroneal tendons more so than medially.    She was sent for MRI and is seen back today she is been using her brace and her cane and says that her ankle feels a lot better and does not \"feel loose like it did\".  No longer having any pain along the medial ankle and only a little minimal \"pinch\" over the distal fibula.  HPI    ROS: ankle pain  Past Medical History:   Diagnosis Date   • Ovarian cyst    • Thrombosis     30 years ago - hematoma       Physical Exam:   Vitals:    06/01/20 1027   Temp: 97.8 °F (36.6 °C)   Weight: 74.8 kg (165 lb)   Height: 172.7 cm (68\")   PainSc:   1     Well developed with normal mood.  On exam she has an antalgic gait.  She was nontender over the medial ankle had some slight discomfort over the tip of the fibula and minimal if any discomfort along the peroneal tendons.  She had grossly stable anterior drawer but with some guarding.      Radiology: MRI films and report of the right hindfoot dated 5/27/2020 from [facility] show longitudinal partial-thickness split tear of the retromalleolar and infra malleolar peroneus brevis tendon and also moderate thickening and attenuation of the ATFL and CFL.  There is a small tibiotalar and posterior subtalar effusion and mild marrow edema in the distal aspect of " the lateral malleolus lateral talar process and medial plantar talar head and distal cuboid.      Assessment/Plan: 1.  Right ATFL and CFL sprain   2.  Right longitudinal partial-thickness tear of the peroneus brevis tendon  3.  Mild marrow edema in the lateral malleolus, lateral talar process, medial plantar talar head, distal cuboid  4.  Right distal fibula avulsion fracture    We have discussed operative and nonoperative treatment options and as she is improving she would like to hold off on anything from a surgical standpoint which I think is reasonable at this time.    She understands that should she fail to continue to improve could still require surgical treatment.    We will have her start weaning off of her cane and when she is off of that then may start weaning off of her ASO brace except for activities involving prolonged standing or uneven ground.    We will get her started in some physical therapy as well and I will see her back in 4 weeks with x-rays of her right ankle.  Anything worsens in the interim she will let me know

## 2020-06-04 ENCOUNTER — TREATMENT (OUTPATIENT)
Dept: PHYSICAL THERAPY | Facility: CLINIC | Age: 55
End: 2020-06-04

## 2020-06-04 DIAGNOSIS — M25.571 ACUTE RIGHT ANKLE PAIN: ICD-10-CM

## 2020-06-04 DIAGNOSIS — R26.9 GAIT DIFFICULTY: Primary | ICD-10-CM

## 2020-06-04 DIAGNOSIS — S82.831D CLOSED FRACTURE OF DISTAL END OF RIGHT FIBULA WITH ROUTINE HEALING, UNSPECIFIED FRACTURE MORPHOLOGY, SUBSEQUENT ENCOUNTER: ICD-10-CM

## 2020-06-04 PROCEDURE — 97161 PT EVAL LOW COMPLEX 20 MIN: CPT | Performed by: PHYSICAL THERAPIST

## 2020-06-04 PROCEDURE — 97110 THERAPEUTIC EXERCISES: CPT | Performed by: PHYSICAL THERAPIST

## 2020-06-04 NOTE — PROGRESS NOTES
Physical Therapy Initial Evaluation and Plan of Care    Patient: Jennifer العلي   : 1965  Diagnosis/ICD-10 Code:  Closed fracture of distal end of right fibula with routine healing, unspecified fracture morphology, subsequent encounter [S82.831D], gait difficulty  Referring practitioner: Smith Pruitt*  Past medical Hx reviewed: 2020     Subjective Evaluation    History of Present Illness  Date of onset: 3/28/2020  Date of surgery: N/A   Mechanism of injury: I stepped in a hole with flip flops on and twisted my ankle.  I heard a snap and ended up with a fracture.  I didn't require sx but I was immobilized for quite some time.  I had a boot for 2 weeks, then an ankle brace and something to sleep in.  I have been walking with a cane the last couple of weeks and for the last few days I've been able to walk some without it.    Currently, I have the most trouble with going up and down stairs (especially down).  Getting in/out of tub can be tough.  I sleep pretty well with my brace on.  I have been able to continue driving since the boot has been removed.  I can walk/stand about 15 min.  I actually start to feel the L side bother me some because of all the extra weight I'm putting on it.        Patient Occupation: working from home: full-time.    Precautions and Work Restrictions: N/A Quality of life: excellent    Pain  Current pain ratin  At best pain ratin  At worst pain rating: 3  Location: R lateral ankle. Achilles area.    Quality: sharp and dull ache (only when stepping)  Relieving factors: change in position and rest  Aggravating factors: ambulation, squatting, repetitive movement, stairs, standing, lifting and movement  Progression: improved    Social Support  Lives in: multiple-level home    Diagnostic Tests  X-ray: abnormal    Patient Goals  Patient goals for therapy: decreased pain, increased motion, improved balance, increased strength and independence with ADLs/IADLs  Patient goal:  Return to hiking.      PLOF: Independent.    Objective          Active Range of Motion   Left Ankle/Foot   Dorsiflexion (ke): 8 degrees   Plantar flexion: 50 degrees   Inversion: 50 degrees   Eversion: 30 degrees     Right Ankle/Foot   Plantar flexion: 40 degrees   Inversion: 20 degrees   Eversion: 10 degrees     Additional Active Range of Motion Details  R: DF:  -4 deg.      Joint Play     Right Ankle/Foot  Joints within functional limits are the midfoot and forefoot. Hypomobile in the fibular head, distal tibiofibular joint, talocrural joint and subtalar joint.     Strength/Myotome Testing     Right Ankle/Foot   Dorsiflexion: 5  Plantar flexion: 4-  Inversion: 4  Eversion: 3+  Great toe flexion: 4+  Great toe extension: 4+    Swelling     Right Ankle/Foot   Malleoli girth: minimal swelling still noted R Vs L     Ambulation   Weight-Bearing Status   Weight-Bearing Status (Right): weight-bearing as tolerated    Assistive device used: single point cane (only used for balance outdoors)    Ambulation: Level Surfaces   Ambulation with assistive device: independent  Ambulation without assistive device: independent    Observational Gait   Gait: antalgic and asymmetric   Decreased walking speed, right stance time and left step length.     Additional Observational Gait Details  Min to no rollover on R ankle.  Min to no push-off on R.       Functional Assessment     Single Leg Stance   Left: 30 seconds  Right single leg stance time: unable to perform.    Comments  TU.79 sec.  12.35 with SPC  5 x Sit to stand:  11.20 sec.   Balance:  Tandem stance.  R on L 30 sec. No assist to set up. L on R UE assist for set up: 30 sec.    Sternal nudge x 3: No LOB          Assessment & Plan     Assessment  Impairments: abnormal gait, abnormal or restricted ROM, impaired balance, impaired physical strength, lacks appropriate home exercise program, pain with function and weight-bearing intolerance  Assessment details: Pt presents to PT  with signs and symptoms consistent with healing fibula fracture and peroneal tendon injury.  She is having ongoing difficulty with weight bearing tolerances and gait mechanics.  Pt would benefit from skilled PT intervention to address the deficits described.    Prognosis: good  Prognosis details: Short term goals:  5 Visits   1.Pt to be instructed in initial HEP.  2. c/o pain to 3/10 for ease with ambulation on TM/level surface up to 15 min without increase in s/s sustained over 2-3 days.  3. Minimal palpable tenderness to R  4.  Increase ROM (DF to 5, PF to 40 ) to normalize gait, less early heel rise.  5. Pt able to balance on SLS 5 sec. on level surface to help promote safety on uneven terrain.    Long term goals:  8-10 visits  1. Pt to demonstrate independencewith advanced HEP  2. Decrease c/o pain to 1/10 for ease with functional activity mentioned above>30 min.  3. Full (L) Ankle AROM: 10º DF, PF: 45 degrees, Inv: 30, Ev: 15  4. LEFS > 70/80  (% perceived normal ability)  5. No palapable tenderness to R lateral leg.    6.  SLS balance on uneven surface for up to 15 sec. For increased safety and endurance for walking on outdoor uneven surfaces.   7.  Strength to 5-/5 all planes of ankle as needed for prolonged ambulation as needed for home/self-care.    8.  TUG: < 8 sec. Without perceivable limp and good step through.   Functional Limitations: walking, uncomfortable because of pain, standing and unable to perform repetitive tasks  Plan  Therapy options: will be seen for skilled physical therapy services  Planned modality interventions: ultrasound, TENS and cryotherapy  Planned therapy interventions: joint mobilization, home exercise program, gait training, flexibility, balance/weight-bearing training, manual therapy, neuromuscular re-education, soft tissue mobilization, strengthening, stretching and therapeutic activities  Frequency: 2x week  Duration in visits: 8  Treatment plan discussed with:  patient    Manual Therapy:    -     mins  09243;  Therapeutic Exercise:    15     mins  28195;     Neuromuscular [place]:    -    mins  22163;    Therapeutic Activity:     -     mins  60995;     Gait Training:      -     mins  71721;     Ultrasound:     -     mins  58579;    Electrical Stimulation:    -     mins  83098 ( );  Dry Needling     -     mins self-pay    Timed Treatment:   15   mins   Total Treatment:     60   mins    PT SIGNATURE:  Jim Javier PT, DPT     Jim Javier PT   [place] License #: [id number]    DATE TREATMENT INITIATED: 6/4/2020    Initial Certification  Certification Period: 9/2/2020  I certify that the therapy services are furnished while this patient is under my care.  The services outlined above are required by this patient, and will be reviewed every 90 days.     PHYSICIAN: Smith Pruitt MD      DATE:     Please sign and return via fax to [phone].. Thank you, [facility].

## 2020-06-08 ENCOUNTER — OFFICE VISIT (OUTPATIENT)
Dept: FAMILY MEDICINE CLINIC | Facility: CLINIC | Age: 55
End: 2020-06-08

## 2020-06-08 VITALS
TEMPERATURE: 98.2 F | HEART RATE: 58 BPM | BODY MASS INDEX: 25.46 KG/M2 | HEIGHT: 68 IN | SYSTOLIC BLOOD PRESSURE: 120 MMHG | DIASTOLIC BLOOD PRESSURE: 82 MMHG | WEIGHT: 168 LBS | OXYGEN SATURATION: 98 %

## 2020-06-08 DIAGNOSIS — R07.89 ATYPICAL CHEST PAIN: ICD-10-CM

## 2020-06-08 DIAGNOSIS — S82.64XA CLOSED NONDISPLACED FRACTURE OF LATERAL MALLEOLUS OF RIGHT FIBULA, INITIAL ENCOUNTER: ICD-10-CM

## 2020-06-08 DIAGNOSIS — R03.0 TRANSIENT ELEVATED BLOOD PRESSURE: Primary | ICD-10-CM

## 2020-06-08 PROCEDURE — 99213 OFFICE O/P EST LOW 20 MIN: CPT | Performed by: FAMILY MEDICINE

## 2020-06-08 NOTE — PROGRESS NOTES
Subjective   Jennifer العلي is a 55 y.o. female with   Chief Complaint   Patient presents with   • Hypertension   .    History of Present Illness   55-year-old white female with recent essential hypertension here for further medical management.  Patient initially prescribed Benicar which was not approved by her insurance and she was then given lisinopril at 20 mg daily.  Product was well-tolerated and her blood pressure responded well.  She however began to also change lifestyle limiting salt intake which seem to have a remarkable benefit.  She had blood pressure as low as 98/72.  She began to take half a pill per day and then developed left precordial chest pain.  At that time she had been using a walker on wheels due to a fracture of an ankle.  She was not sure if it were the medicine or the walker so she discontinued both and the chest pain resolved.  She has had no further chest pain.  She has a brother whose girlfriend is a nurse anesthetist who is been taking her blood pressure on a manual basis without the lisinopril.  Blood pressures have been running anywhere from 108-122/60-82.  Saturday she had a blood pressure of 136/90 which has been a departure from previous well controlled numbers.  She currently is without medication over the last 2 weeks with current blood pressure obtained by our cuff.  She had an MRI of her ankle and it appears the fracture has healed so no surgery will be needed.  She is currently in rehab.  The following portions of the patient's history were reviewed and updated as appropriate: allergies, current medications, past family history, past medical history, past social history, past surgical history and problem list.    Review of Systems   Cardiovascular: Positive for chest pain (Atypical left precordial chest pain).        Elevated blood pressure   Musculoskeletal: Positive for arthralgias.       Objective     Vitals:    06/08/20 1021   BP: 120/82   Pulse: 58   Temp: 98.2 °F (36.8 °C)    SpO2: 98%       No results found for this or any previous visit (from the past 672 hour(s)).    Physical Exam   Constitutional: She is oriented to person, place, and time. She appears well-developed and well-nourished.   Blood pressure is normotensive at 120/82   HENT:   Head: Normocephalic and atraumatic.   Neck: Trachea normal and phonation normal. Neck supple. Normal carotid pulses present. Carotid bruit is not present. No thyroid mass and no thyromegaly present.   Cardiovascular: Normal rate, regular rhythm and normal heart sounds. Exam reveals no gallop and no friction rub.   No murmur heard.  Pulmonary/Chest: Effort normal and breath sounds normal. No respiratory distress. She has no decreased breath sounds. She has no wheezes. She has no rhonchi. She has no rales.   Lymphadenopathy:     She has no cervical adenopathy.   Neurological: She is alert and oriented to person, place, and time.   Skin: Skin is warm and dry. No rash noted.   Psychiatric: She has a normal mood and affect. Her speech is normal and behavior is normal. Judgment and thought content normal. Cognition and memory are normal.   Nursing note and vitals reviewed.      Assessment/Plan   Jennifer was seen today for hypertension.    Diagnoses and all orders for this visit:    Transient elevated blood pressure  -     Lipid panel; Future  -     Comprehensive metabolic panel; Future  -     Vitamin D 25 hydroxy; Future  -     TSH; Future  -     CBC w AUTO Differential; Future    Atypical chest pain  -     Lipid panel; Future  -     Comprehensive metabolic panel; Future  -     Vitamin D 25 hydroxy; Future  -     TSH; Future  -     CBC w AUTO Differential; Future    Closed nondisplaced fracture of lateral malleolus of right fibula, initial encounter  -     Lipid panel; Future  -     Comprehensive metabolic panel; Future  -     Vitamin D 25 hydroxy; Future  -     TSH; Future  -     CBC w AUTO Differential; Future    Patient will schedule a time for fasting  lab work as well as a follow-up complete physical exam.  Currently we will not reinstitute antihypertensives.    Return if symptoms worsen or fail to improve, for Next scheduled follow up.

## 2020-06-09 ENCOUNTER — TREATMENT (OUTPATIENT)
Dept: PHYSICAL THERAPY | Facility: CLINIC | Age: 55
End: 2020-06-09

## 2020-06-09 DIAGNOSIS — S82.831D CLOSED FRACTURE OF DISTAL END OF RIGHT FIBULA WITH ROUTINE HEALING, UNSPECIFIED FRACTURE MORPHOLOGY, SUBSEQUENT ENCOUNTER: Primary | ICD-10-CM

## 2020-06-09 DIAGNOSIS — M25.571 ACUTE RIGHT ANKLE PAIN: ICD-10-CM

## 2020-06-09 DIAGNOSIS — R26.9 GAIT DIFFICULTY: ICD-10-CM

## 2020-06-09 PROCEDURE — 97530 THERAPEUTIC ACTIVITIES: CPT | Performed by: PHYSICAL THERAPIST

## 2020-06-09 PROCEDURE — 97110 THERAPEUTIC EXERCISES: CPT | Performed by: PHYSICAL THERAPIST

## 2020-06-09 NOTE — PROGRESS NOTES
Physical Therapy Daily Progress Note  Visit # 2      Subjective   Jennifer العلي reports:   I'm doing so much better than last time I was in.  Rates pain 3/10 currently.  Reports increased swelling yesterday, also notes she was the most active she has been since her accident.  Wearing a compression sock yesterday and today to control swelling.        Objective   See Exercise, Manual, and Modality Logs for complete treatment.   Reviewed current HEP, progressed with t-band exercises, issued written instructions and yellow, red, green bands.        Assessment & Plan     Assessment  Assessment details: Tolerated progression of therapeutic exercise well today, no increased pain reported during or after exercises.  Improved gait pattern after step-through practice and with cues to decrease gait speed.    Issued several new HEP exercises as pt will not return to PT for 1 week, advised pt to progress slowly and stop if any increase in pain noted during.            Progress per Plan of Care and Progress strengthening /stabilization /functional activity           Timed:         Manual Therapy:         mins  84791     Therapeutic Exercise:     40    mins  09673     Neuromuscular [place]:        mins  48937    Therapeutic Activity:      10    mins  47121     Gait Training:           mins  78559     Ultrasound:          mins  10805    [place]                                   mins  87656  Self Care                            mins  05209    Un-Timed:  Electrical Stimulation:         mins 28095 ( )  Traction          mins 98509    Timed Treatment:   50   mins   Total Treatment:     55   mins    DEZ Leblanc License #[id number]  Physical Therapist Assistant

## 2020-06-16 ENCOUNTER — TREATMENT (OUTPATIENT)
Dept: PHYSICAL THERAPY | Facility: CLINIC | Age: 55
End: 2020-06-16

## 2020-06-16 DIAGNOSIS — S82.831D CLOSED FRACTURE OF DISTAL END OF RIGHT FIBULA WITH ROUTINE HEALING, UNSPECIFIED FRACTURE MORPHOLOGY, SUBSEQUENT ENCOUNTER: Primary | ICD-10-CM

## 2020-06-16 DIAGNOSIS — R26.9 GAIT DIFFICULTY: ICD-10-CM

## 2020-06-16 DIAGNOSIS — M25.571 ACUTE RIGHT ANKLE PAIN: ICD-10-CM

## 2020-06-16 PROCEDURE — 97110 THERAPEUTIC EXERCISES: CPT | Performed by: PHYSICAL THERAPIST

## 2020-06-16 PROCEDURE — 97140 MANUAL THERAPY 1/> REGIONS: CPT | Performed by: PHYSICAL THERAPIST

## 2020-06-16 NOTE — PROGRESS NOTES
Physical Therapy Daily Progress Note      Patient: Jennifer العلي   : 1965  Diagnosis/ICD-10 Code:  Closed fracture of distal end of right fibula with routine healing, unspecified fracture morphology, subsequent encounter [S82.831D]  Referring practitioner: Smith Pruitt*  Date of Initial Visit: Type: THERAPY  Noted: 2020  Today's Date: 2020  Patient seen for 3 sessions    Subjective : Jennifer العلي reports: I'm doing really well with the ankle but I have noticed a little bit more pain under the ball of the R foot.  The ankle is a little bit swollen today. I do have a compression sock on today but no bracing.      Objective: Noted medial and lateral ankle firm edema observed.  No bruising or noticeable irritation to the plantar surface of R foot.    See Exercise, Manual, and Modality Logs for complete treatment.     Assessment/Plan:  Manual intervention to ankle for edema control and increased mobility to the ankle.  Pt tolerated treatment well reporting feeling less compression following session.     Progress per Plan of Care and Progress strengthening /stabilization /functional activity     Manual Therapy:    12     mins  01685;  Therapeutic Exercise:    43     mins  27147;     Neuromuscular [place]:    -    mins  33894;    Therapeutic Activity:     -     mins  55040;     Gait Training:      -     mins  08869;     Ultrasound:     -     mins  38171;    Electrical Stimulation:    -     mins  27755 ( );  Dry Needling     -     mins self-pay    Timed Treatment:   55   mins   Total Treatment:     65   mins      LUÍS Best License #[id number]    Physical Therapist

## 2020-06-18 ENCOUNTER — TREATMENT (OUTPATIENT)
Dept: PHYSICAL THERAPY | Facility: CLINIC | Age: 55
End: 2020-06-18

## 2020-06-18 DIAGNOSIS — R26.9 GAIT DIFFICULTY: ICD-10-CM

## 2020-06-18 DIAGNOSIS — M25.571 ACUTE RIGHT ANKLE PAIN: ICD-10-CM

## 2020-06-18 DIAGNOSIS — S82.831D CLOSED FRACTURE OF DISTAL END OF RIGHT FIBULA WITH ROUTINE HEALING, UNSPECIFIED FRACTURE MORPHOLOGY, SUBSEQUENT ENCOUNTER: Primary | ICD-10-CM

## 2020-06-18 PROCEDURE — 97110 THERAPEUTIC EXERCISES: CPT | Performed by: PHYSICAL THERAPIST

## 2020-06-18 PROCEDURE — 97140 MANUAL THERAPY 1/> REGIONS: CPT | Performed by: PHYSICAL THERAPIST

## 2020-06-18 NOTE — PROGRESS NOTES
Physical Therapy Daily Progress Note      Patient: Jennifer العلي   : 1965  Diagnosis/ICD-10 Code:  Closed fracture of distal end of right fibula with routine healing, unspecified fracture morphology, subsequent encounter [S82.831D]  Referring practitioner: Smith Pruitt*  Date of Initial Visit: Type: THERAPY  Noted: 2020  Today's Date: 2020  Patient seen for 4 sessions    Subjective : Jennifer العلي reports: The (forefoot) pain is much less today and I did get some good shoes.  They felt really good when I tried them on in the store.  I definitely feel that I am moving /walking better today.      Objective: Decreased observed edema this visit in R LE.  Great selection on footwear.    See Exercise, Manual, and Modality Logs for complete treatment.     Assessment/Plan:Jennifer has done well with her progression thus far.  Purchase of more appropriate footwear has allowed us to progress to WBing activity without irritating the foot.      Progress per Plan of Care and Progress strengthening /stabilization /functional activity     Manual Therapy:    12     mins  23795;  Therapeutic Exercise:    40     mins  99435;     Neuromuscular [place]:    6    mins  38354;    Therapeutic Activity:     -     mins  92765;     Gait Training:      -     mins  09781;     Ultrasound:     -     mins  75828;    Electrical Stimulation:    -     mins  90105 ( );  Dry Needling     -     mins self-pay    Timed Treatment:   58   mins   Total Treatment:     60   mins      LUÍS Best License #[id number]    Physical Therapist

## 2020-06-22 ENCOUNTER — TREATMENT (OUTPATIENT)
Dept: PHYSICAL THERAPY | Facility: CLINIC | Age: 55
End: 2020-06-22

## 2020-06-22 DIAGNOSIS — R26.9 GAIT DIFFICULTY: ICD-10-CM

## 2020-06-22 DIAGNOSIS — M25.571 ACUTE RIGHT ANKLE PAIN: ICD-10-CM

## 2020-06-22 DIAGNOSIS — S82.831D CLOSED FRACTURE OF DISTAL END OF RIGHT FIBULA WITH ROUTINE HEALING, UNSPECIFIED FRACTURE MORPHOLOGY, SUBSEQUENT ENCOUNTER: Primary | ICD-10-CM

## 2020-06-22 PROCEDURE — 97140 MANUAL THERAPY 1/> REGIONS: CPT | Performed by: PHYSICAL THERAPIST

## 2020-06-22 PROCEDURE — 97112 NEUROMUSCULAR REEDUCATION: CPT | Performed by: PHYSICAL THERAPIST

## 2020-06-22 PROCEDURE — 97110 THERAPEUTIC EXERCISES: CPT | Performed by: PHYSICAL THERAPIST

## 2020-06-22 NOTE — PROGRESS NOTES
Physical Therapy Daily Progress Note      Patient: Jennifer العلي   : 1965  Diagnosis/ICD-10 Code:  Closed fracture of distal end of right fibula with routine healing, unspecified fracture morphology, subsequent encounter [S82.831D]  Referring practitioner: Smith Pruitt*  Date of Initial Visit: Type: THERAPY  Noted: 2020  Today's Date: 2020  Patient seen for 5 sessions    Subjective : Jennifer العلي reports: The ankle feels a little stiff today.  (Upon first attempt on TM) I feel some pinching on the front of the ankle joint.      Objective:   See Exercise, Manual, and Modality Logs for complete treatment.     Assessment/Plan:Pt tolerated treatment well overall.  She still has some discomfort with full weight bearing into the R ankle. We continue to train up the WB'ing tolerances but building in breaks and alternating between seated and standing tasks to help.      Progress per Plan of Care and Progress strengthening /stabilization /functional activity     Manual Therapy:    8     mins  75179;  Therapeutic Exercise:    35     mins  09234;     Neuromuscular [place]:    12    mins  37638;    Therapeutic Activity:     -     mins  06920;     Gait Training:      -     mins  90128;     Ultrasound:     -     mins  87209;    Electrical Stimulation:    -     mins  16925 ( );  Dry Needling     -     mins self-pay    Timed Treatment:   55   mins   Total Treatment:     60   mins      LUÍS Best License #[id number]    Physical Therapist

## 2020-06-25 ENCOUNTER — TREATMENT (OUTPATIENT)
Dept: PHYSICAL THERAPY | Facility: CLINIC | Age: 55
End: 2020-06-25

## 2020-06-25 DIAGNOSIS — R26.9 GAIT DIFFICULTY: ICD-10-CM

## 2020-06-25 DIAGNOSIS — M25.571 ACUTE RIGHT ANKLE PAIN: ICD-10-CM

## 2020-06-25 DIAGNOSIS — S82.831D CLOSED FRACTURE OF DISTAL END OF RIGHT FIBULA WITH ROUTINE HEALING, UNSPECIFIED FRACTURE MORPHOLOGY, SUBSEQUENT ENCOUNTER: Primary | ICD-10-CM

## 2020-06-25 PROCEDURE — 97112 NEUROMUSCULAR REEDUCATION: CPT | Performed by: PHYSICAL THERAPIST

## 2020-06-25 PROCEDURE — 97110 THERAPEUTIC EXERCISES: CPT | Performed by: PHYSICAL THERAPIST

## 2020-06-25 PROCEDURE — 97140 MANUAL THERAPY 1/> REGIONS: CPT | Performed by: PHYSICAL THERAPIST

## 2020-06-25 NOTE — PROGRESS NOTES
Physical Therapy Daily Progress Note      Patient: Jennifer العلي   : 1965  Diagnosis/ICD-10 Code:  Closed fracture of distal end of right fibula with routine healing, unspecified fracture morphology, subsequent encounter [S82.831D]  Referring practitioner: Smith Pruitt*  Date of Initial Visit: Type: THERAPY  Noted: 2020  Today's Date: 2020  Patient seen for 6 sessions    Subjective : Jennifer العلي reports: I have been having some pain to the top/side of the R foot.  [place] and did pretty well there.     Objective: Notable R ankle and lower leg edema.  Ankle socks donned.    See Exercise, Manual, and Modality Logs for complete treatment.     Assessment/Plan:Pt continues to have early heel rise with lateral WB'ing vs. Push off through the great toe.  She was educated on optimizing gait performed manual intervention to forefoot, MTP joints as well.  Manual intervention to reduce edema was helpful.  She was educated to continue with compression sock wear due to the extent of her LE edema.      Progress per Plan of Care and Progress strengthening /stabilization /functional activity     Manual Therapy:    15     mins  38172;  Therapeutic Exercise:    30     mins  29172;     Neuromuscular [place]:    8    mins  54928;    Therapeutic Activity:     -     mins  14818;     Gait Training:      -     mins  45109;     Ultrasound:     -     mins  81347;    Electrical Stimulation:    -     mins  08619 ( );  Dry Needling     -     mins self-pay    Timed Treatment:   53   mins   Total Treatment:     58    mins      LUÍS Best License #[id number]    Physical Therapist

## 2020-06-29 ENCOUNTER — OFFICE VISIT (OUTPATIENT)
Dept: ORTHOPEDIC SURGERY | Facility: CLINIC | Age: 55
End: 2020-06-29

## 2020-06-29 VITALS — HEIGHT: 68 IN | BODY MASS INDEX: 25.46 KG/M2 | WEIGHT: 168 LBS | TEMPERATURE: 97.2 F

## 2020-06-29 DIAGNOSIS — S82.64XD CLOSED NONDISPLACED FRACTURE OF LATERAL MALLEOLUS OF RIGHT FIBULA WITH ROUTINE HEALING, SUBSEQUENT ENCOUNTER: Primary | ICD-10-CM

## 2020-06-29 DIAGNOSIS — S86.311D TEAR OF PERONEAL TENDON, RIGHT, SUBSEQUENT ENCOUNTER: ICD-10-CM

## 2020-06-29 DIAGNOSIS — S93.401D SPRAIN OF RIGHT ANKLE, UNSPECIFIED LIGAMENT, SUBSEQUENT ENCOUNTER: ICD-10-CM

## 2020-06-29 DIAGNOSIS — R93.7 BONE MARROW EDEMA: ICD-10-CM

## 2020-06-29 PROCEDURE — 99024 POSTOP FOLLOW-UP VISIT: CPT | Performed by: ORTHOPAEDIC SURGERY

## 2020-06-29 PROCEDURE — 73610 X-RAY EXAM OF ANKLE: CPT | Performed by: ORTHOPAEDIC SURGERY

## 2020-06-29 NOTE — PROGRESS NOTES
"Ankle Follow Up      Patient: Jennifer العلي    YOB: 1965 55 y.o. female    Chief Complaints: Ankle feels much better    History of Present Illness: Patient follows up injured her right ankle that occurred on 3/28/2020 with initial evaluation on 4/13/2020.  This happened when she was on some elevated flip-flops and stepped in a hole in her yard.  She been followed for distal fibular avulsion fracture as well as partial-thickness tear of the peroneus brevis tendon and some marrow edema as well as ATFL and CFL sprains.    She was last seen on 6/1/2020 and has weaned out of her brace except for prolonged standing and activities on uneven ground.  She been doing physical therapy and says she is \"walking better and feeling great\" still gets a little bit of stiffness with steps.  Stated some occasional mild swelling and some occasional painless \"snapping\" in her ankle when she circumduction.  HPI    ROS: No ankle pain  Past Medical History:   Diagnosis Date   • Allergic    • Ovarian cyst    • Thrombosis     30 years ago - hematoma       Physical Exam:   Vitals:    06/29/20 1045   Temp: 97.2 °F (36.2 °C)   Weight: 76.2 kg (168 lb)   Height: 172.7 cm (68\")     Well developed with normal mood.  Right ankle shows only mild swelling no warmth erythema there was no tenderness to palpation of the distal fibula no pain on the peroneal tendons to palpation and no subluxation with resisted eversion.  Grossly stable anterior drawer and no focal pain along the inferolateral hindfoot or medial midfoot.      Radiology: 3 views of the right ankle ordered evaluate fracture and alignment reviewed and compared with previous x-rays the evulsion fragment is still evident over the distal fibula but appears to be somewhat more sclerotic but no evidence of displacement.  She is somewhat osteopenic.      Assessment/Plan:   1.  Right ATFL and CFL sprain   2.  Right longitudinal partial-thickness tear of the peroneus brevis tendon  3.  " Mild marrow edema in the lateral malleolus, lateral talar process, medial plantar talar head, distal cuboid  4.  Right distal fibula avulsion fracture    Overall she seems to be markedly improved I would not recommend any surgical treatment at this time.    She will continue with physical therapy and progressed home exercises and use her brace for any activities evolving prolonged standing or uneven ground.    Anything worsens to let me know otherwise I will see her back in 6 weeks with x-rays of her ankle if she is having any pain.

## 2020-06-30 ENCOUNTER — TREATMENT (OUTPATIENT)
Dept: PHYSICAL THERAPY | Facility: CLINIC | Age: 55
End: 2020-06-30

## 2020-06-30 DIAGNOSIS — R26.9 GAIT DIFFICULTY: ICD-10-CM

## 2020-06-30 DIAGNOSIS — M25.571 ACUTE RIGHT ANKLE PAIN: ICD-10-CM

## 2020-06-30 DIAGNOSIS — S82.831D CLOSED FRACTURE OF DISTAL END OF RIGHT FIBULA WITH ROUTINE HEALING, UNSPECIFIED FRACTURE MORPHOLOGY, SUBSEQUENT ENCOUNTER: Primary | ICD-10-CM

## 2020-06-30 PROCEDURE — 97112 NEUROMUSCULAR REEDUCATION: CPT | Performed by: PHYSICAL THERAPIST

## 2020-06-30 PROCEDURE — 97110 THERAPEUTIC EXERCISES: CPT | Performed by: PHYSICAL THERAPIST

## 2020-06-30 PROCEDURE — 97140 MANUAL THERAPY 1/> REGIONS: CPT | Performed by: PHYSICAL THERAPIST

## 2020-06-30 NOTE — PROGRESS NOTES
Physical Therapy Daily Progress Note      Patient: Jennifer العلي   : 1965  Diagnosis/ICD-10 Code:  Closed fracture of distal end of right fibula with routine healing, unspecified fracture morphology, subsequent encounter [S82.831D]  Referring practitioner: Smith Pruitt*  Date of Initial Visit: Type: THERAPY  Noted: 2020  Today's Date: 2020  Patient seen for 7 sessions    Subjective : Jennifer العلي reports: The foot is doing better today.  I still have some discomfort to the side of the foot but its improving.      Objective: Mild tenderness to lateral R forefoot.   See Exercise, Manual, and Modality Logs for complete treatment.     Assessment/Plan:Pt was thoroughly educated on the findings of her most recent MRI and what they mean as it pertains to her recovery.  Bones are well healed but tear noted on peroneal brevis tendon which seems to be the culprit of the ongoing limitation. She is however showing good improvement noted by the ability to walk faster gait speeds (2.0+) and longer times (up to 7 min) with less early heel rise and longer strides.  We did progress stability training as well     Progress per Plan of Care and Progress strengthening /stabilization /functional activity     Manual Therapy:    10     mins  56737;  Therapeutic Exercise:    35     mins  00666;     Neuromuscular [place]:    10    mins  25465;    Therapeutic Activity:     -     mins  80795;     Gait Training:      -     mins  77233;     Ultrasound:     -     mins  81066;    Electrical Stimulation:    -     mins  01053 ( );  Dry Needling     -     mins self-pay    Timed Treatment:   55   mins   Total Treatment:     60   mins      LUÍS Best License #[id number]    Physical Therapist

## 2020-07-02 ENCOUNTER — TREATMENT (OUTPATIENT)
Dept: PHYSICAL THERAPY | Facility: CLINIC | Age: 55
End: 2020-07-02

## 2020-07-02 DIAGNOSIS — M25.571 ACUTE RIGHT ANKLE PAIN: ICD-10-CM

## 2020-07-02 DIAGNOSIS — S82.831D CLOSED FRACTURE OF DISTAL END OF RIGHT FIBULA WITH ROUTINE HEALING, UNSPECIFIED FRACTURE MORPHOLOGY, SUBSEQUENT ENCOUNTER: Primary | ICD-10-CM

## 2020-07-02 DIAGNOSIS — R26.9 GAIT DIFFICULTY: ICD-10-CM

## 2020-07-02 PROCEDURE — 97110 THERAPEUTIC EXERCISES: CPT | Performed by: PHYSICAL THERAPIST

## 2020-07-02 PROCEDURE — 97112 NEUROMUSCULAR REEDUCATION: CPT | Performed by: PHYSICAL THERAPIST

## 2020-07-02 PROCEDURE — 97140 MANUAL THERAPY 1/> REGIONS: CPT | Performed by: PHYSICAL THERAPIST

## 2020-07-02 NOTE — PROGRESS NOTES
Physical Therapy Daily Progress Note      Patient: Jennifer العلي   : 1965  Diagnosis/ICD-10 Code:  Closed fracture of distal end of right fibula with routine healing, unspecified fracture morphology, subsequent encounter [S82.831D]  Referring practitioner: Smith Pruitt*  Date of Initial Visit: Type: THERAPY  Noted: 2020  Today's Date: 2020  Patient seen for 8 sessions    Subjective : Jennifer العلي reports: I feel pretty good today.  The swelling looks better than it has since the injury happened.      Objective:   See Exercise, Manual, and Modality Logs for complete treatment.     Assessment/Plan:Pt tolerated treatment very well and we continue to progress toward her goals and maximizing function.      Progress per Plan of Care and Progress strengthening /stabilization /functional activity     Manual Therapy:    12     mins  05108;  Therapeutic Exercise:    35     mins  64381;     Neuromuscular [place]:    12    mins  49879;    Therapeutic Activity:     -     mins  91875;     Gait Training:      -     mins  85460;     Ultrasound:     -     mins  14301;    Electrical Stimulation:    -     mins  54294 ( );  Dry Needling     -     mins self-pay    Timed Treatment:   59   mins   Total Treatment:     65   mins      LUSÍ Best License #[id number]    Physical Therapist

## 2020-07-09 ENCOUNTER — TREATMENT (OUTPATIENT)
Dept: PHYSICAL THERAPY | Facility: CLINIC | Age: 55
End: 2020-07-09

## 2020-07-09 DIAGNOSIS — R26.9 GAIT DIFFICULTY: ICD-10-CM

## 2020-07-09 DIAGNOSIS — M25.571 ACUTE RIGHT ANKLE PAIN: ICD-10-CM

## 2020-07-09 DIAGNOSIS — S82.831D CLOSED FRACTURE OF DISTAL END OF RIGHT FIBULA WITH ROUTINE HEALING, UNSPECIFIED FRACTURE MORPHOLOGY, SUBSEQUENT ENCOUNTER: Primary | ICD-10-CM

## 2020-07-09 PROCEDURE — 97112 NEUROMUSCULAR REEDUCATION: CPT | Performed by: PHYSICAL THERAPIST

## 2020-07-09 PROCEDURE — 97110 THERAPEUTIC EXERCISES: CPT | Performed by: PHYSICAL THERAPIST

## 2020-07-09 PROCEDURE — A9999 DME SUPPLY OR ACCESSORY, NOS: HCPCS | Performed by: PHYSICAL THERAPIST

## 2020-07-09 PROCEDURE — 97140 MANUAL THERAPY 1/> REGIONS: CPT | Performed by: PHYSICAL THERAPIST

## 2020-07-09 NOTE — PROGRESS NOTES
"Physical Therapy Daily Progress Note  Visit # 9      Subjective   Jennifer العلي reports:   Feeling a little tightness in dorsal area (R) foot today, \"nothing major.\"  Feels she is progressing well overall, no c/o with ADLs or normal activity.        Objective   See Exercise, Manual, and Modality Logs for complete treatment.   Progressed with graded interval walking on TM, alternating 2 min flat/1 min 5 degree grade x 10 min total  Added eccentric lowering (R) foot from bilat heel raise position.      Assessment & Plan     Assessment  Assessment details: Progressed through today's PT interventions well, some fatigue noted with leg press, eccentric ankle movements, otherwise no c/o throughout.      Plan  Plan details: To follow up with evaluating PT in one week.            Progress per Plan of Care and Progress strengthening /stabilization /functional activity           Timed:         Manual Therapy:     10    mins  77008     Therapeutic Exercise:     30    mins  24172     Neuromuscular [place]:    14    mins  40880    Therapeutic Activity:          mins  99738     Gait Training:           mins  90746     Ultrasound:          mins  48296    [place]                                   mins  35945  Self Care                            mins  48840    Un-Timed:  Electrical Stimulation:         mins 57869 ( )  Traction          mins 22299    Timed Treatment:   54   mins   Total Treatment:     58   mins    DEZ Leblanc License #[id number]  Physical Therapist Assistant  "

## 2020-07-31 ENCOUNTER — RESULTS ENCOUNTER (OUTPATIENT)
Dept: FAMILY MEDICINE CLINIC | Facility: CLINIC | Age: 55
End: 2020-07-31

## 2020-07-31 DIAGNOSIS — S82.64XA CLOSED NONDISPLACED FRACTURE OF LATERAL MALLEOLUS OF RIGHT FIBULA, INITIAL ENCOUNTER: ICD-10-CM

## 2020-07-31 DIAGNOSIS — R07.89 ATYPICAL CHEST PAIN: ICD-10-CM

## 2020-07-31 DIAGNOSIS — R03.0 TRANSIENT ELEVATED BLOOD PRESSURE: ICD-10-CM

## 2020-08-10 ENCOUNTER — OFFICE VISIT (OUTPATIENT)
Dept: ORTHOPEDIC SURGERY | Facility: CLINIC | Age: 55
End: 2020-08-10

## 2020-08-10 VITALS — HEIGHT: 68 IN | WEIGHT: 168 LBS | TEMPERATURE: 97.1 F | BODY MASS INDEX: 25.46 KG/M2

## 2020-08-10 DIAGNOSIS — S93.401D SPRAIN OF RIGHT ANKLE, UNSPECIFIED LIGAMENT, SUBSEQUENT ENCOUNTER: Primary | ICD-10-CM

## 2020-08-10 DIAGNOSIS — S82.64XD CLOSED NONDISPLACED FRACTURE OF LATERAL MALLEOLUS OF RIGHT FIBULA WITH ROUTINE HEALING, SUBSEQUENT ENCOUNTER: ICD-10-CM

## 2020-08-10 DIAGNOSIS — S86.311D TEAR OF PERONEAL TENDON, RIGHT, SUBSEQUENT ENCOUNTER: ICD-10-CM

## 2020-08-10 PROCEDURE — 99213 OFFICE O/P EST LOW 20 MIN: CPT | Performed by: ORTHOPAEDIC SURGERY

## 2020-08-10 NOTE — PROGRESS NOTES
"Ankle Follow Up      Patient: Jennifer العلي    YOB: 1965 55 y.o. female    Chief Complaints: Ankle feels good    History of Present Illness: Patient follows up injured her right ankle that occurred on 3/28/2020 with initial evaluation on 4/13/2020.  This happened when she was on some elevated flip-flops and stepped in a hole in her yard.  She been followed for distal fibular avulsion fracture as well as partial-thickness tear of the peroneus brevis tendon and some marrow edema as well as ATFL and CFL sprains.    She was last seen on 6/29/2020 and has been out of her brace now for at least the last 6 weeks or but did wear it some when she went camping about a month ago and asked he was able to [employer] and tolerate this.    She said her ankle feels great and not really having any complaints of pain or instability.  She is due to complete physical therapy this week  HPI    ROS: No ankle pain  Past Medical History:   Diagnosis Date   • Allergic    • Ovarian cyst    • Thrombosis     30 years ago - hematoma       Physical Exam:   Vitals:    08/10/20 1039   Temp: 97.1 °F (36.2 °C)   Weight: 76.2 kg (168 lb)   Height: 172.7 cm (68\")   PainSc: 0-No pain     Well developed with normal mood.  Right ankle shows a slight swelling but no tenderness over the anterolateral aspect or along the tip of the fibula nor along the peroneal tendons.  Grossly stable anterior drawer that is slightly more on the right than the left but certainly no sulcus no varus instability no pain on the peroneal tendons to palpation or with resisted eversion.      Radiology: None performed      Assessment/Plan:  1.  Right ATFL and CFL sprain   2.  Right longitudinal partial-thickness tear of the peroneus brevis tendon  3.  Mild marrow edema in the lateral malleolus, lateral talar process, medial plantar talar head, distal cuboid  4.  Right distal fibula avulsion fracture    Overall she seems to be doing very well and is \"thrilled\".  She will " finish physical therapy and will continue with home exercises.  Recommend she use her brace for at least the next 6 to 8 weeks for any activity involving uneven ground.    If anything worsens as far as any return of pain or feelings of instability should let me know otherwise I will see her back as needed

## 2020-08-13 ENCOUNTER — TREATMENT (OUTPATIENT)
Dept: PHYSICAL THERAPY | Facility: CLINIC | Age: 55
End: 2020-08-13

## 2020-08-13 DIAGNOSIS — M25.571 ACUTE RIGHT ANKLE PAIN: ICD-10-CM

## 2020-08-13 DIAGNOSIS — R26.9 GAIT DIFFICULTY: ICD-10-CM

## 2020-08-13 DIAGNOSIS — S82.831D CLOSED FRACTURE OF DISTAL END OF RIGHT FIBULA WITH ROUTINE HEALING, UNSPECIFIED FRACTURE MORPHOLOGY, SUBSEQUENT ENCOUNTER: Primary | ICD-10-CM

## 2020-08-13 PROCEDURE — 97112 NEUROMUSCULAR REEDUCATION: CPT | Performed by: PHYSICAL THERAPIST

## 2020-08-13 PROCEDURE — 97530 THERAPEUTIC ACTIVITIES: CPT | Performed by: PHYSICAL THERAPIST

## 2020-08-13 PROCEDURE — 97110 THERAPEUTIC EXERCISES: CPT | Performed by: PHYSICAL THERAPIST

## 2020-08-13 NOTE — PROGRESS NOTES
Physical Therapy Discharge Note      Patient: Jennifer العلي   : 1965  Diagnosis/ICD-10 Code:  Closed fracture of distal end of right fibula with routine healing, unspecified fracture morphology, subsequent encounter [S82.831D]  Referring practitioner: Smith Pruitt*  Date of Initial Visit: Type: THERAPY  Noted: 2020  Today's Date: 2020  Patient seen for 10 sessions    Subjective : Jennifer العلي reports: I feel great.  I feel like I am basically back to normal.  I can jog now and walk as much as I want.  I feel comfortable with my progress and being done with PT.     Objective:   Active Range of Motion   Left Ankle/Foot   Dorsiflexion (ke): 8 degrees   Plantar flexion: 50 degrees   Inversion: 50 degrees   Eversion: 30 degrees      Right Ankle/Foot   Plantar flexion: 65 degrees (40 degrees during initial eval)  Inversion: 43 degrees (20 degrees during initial eval)  Eversion: 42 (10 degrees during initial eval)    Additional Active Range of Motion Details  R: DF:  15 degrees (-4 degrees during initial eval)       Joint Play      Right Ankle/Foot  Joints within functional limits are the midfoot and forefoot. Hypomobile in the fibular head, distal tibiofibular joint, talocrural joint and subtalar joint.      Strength/Myotome Testing      Right Ankle/Foot   Dorsiflexion: 5 (5 during initial eval)  Plantar flexion: 5 (4- during initial eval)  Inversion: 5 (4 during initial eval)  Eversion: 5 (3+ during initial eval)  Great toe flexion: 5 (4+ during initial eval)  Great toe extension: 5 (4+ during initial eval)     Swelling      Right Ankle/Foot   M[place]oli girth: minimal swelling still noted R Vs L      Ambulation   Weight-Bearing Status   Weight-Bearing Status (Right): weight-bearing as tolerated    Assistive device used: single point cane (only used for balance outdoors)     Ambulation: Level Surfaces   Ambulation with assistive device: independent  Ambulation without assistive device:  independent     Observational Gait   Gait: antalgic and asymmetric   Decreased walking speed, right stance time and left step length.     Additional Observational Gait Details  Min to no rollover on R ankle.  Min to no push-off on R.        Functional Assessment      Single Leg Stance   Left: 30 seconds  Right single leg stance time: 30 sec. Stable(at eval: unable to perform.)     Comments  TU.41 sec. ( at eval: 13.79 sec.  12.35 with SPC)  5 x Sit to stand:  8.2 sec (at eval: 11.20 sec. )  Balance:  Tandem stance.  R on L 30 sec. No assist to set up. L on R UE assist for set up: 30 sec.    Sternal nudge x 3: No LOB See Exercise, Manual, and Modality Logs for complete treatment.     Assessment/Plan:Pt did very well with PT and will be discharged to [facility] 3-4x /week.  She has been encouraged to return to recreational walking on trails and exercise without limitation.      Other:D/C      Manual Therapy:    -     mins  00356;  Therapeutic Exercise:    35     mins  05934;     Neuromuscular [place]:    8    mins  19497;    Therapeutic Activity:     12     mins  26038;   Including tests and measures   Gait Training:      -     mins  38233;     Ultrasound:     --     mins  45462;    Electrical Stimulation:    -     mins  98169 ( );  Dry Needling     -     mins self-pay    Timed Treatment:   55   mins   Total Treatment:     60   mins      LUÍS Best License #[id number]    Physical Therapist

## 2020-09-10 ENCOUNTER — TELEPHONE (OUTPATIENT)
Dept: OBSTETRICS AND GYNECOLOGY | Age: 55
End: 2020-09-10

## 2020-09-10 NOTE — TELEPHONE ENCOUNTER
CALLED PT AGAIN TO JUST MAKE SURE SHE STILL DOES NOT WANT TO DO GO FOR HER BREAST ULTRASOUND. PT STATES SHE DOES NOT WISH TO SCHEDULE. DOES NOT BELIEVE IN A MAMMOGRAM AND DOES NOT THINK IT IS NECESSARY TO DO SO OFTEN.  NADIA

## 2020-11-24 ENCOUNTER — OFFICE VISIT (OUTPATIENT)
Dept: FAMILY MEDICINE CLINIC | Facility: CLINIC | Age: 55
End: 2020-11-24

## 2020-11-24 ENCOUNTER — HOSPITAL ENCOUNTER (OUTPATIENT)
Dept: GENERAL RADIOLOGY | Facility: HOSPITAL | Age: 55
Discharge: HOME | End: 2020-11-24
Admitting: NURSE PRACTITIONER

## 2020-11-24 VITALS
SYSTOLIC BLOOD PRESSURE: 122 MMHG | DIASTOLIC BLOOD PRESSURE: 76 MMHG | WEIGHT: 166 LBS | HEART RATE: 76 BPM | OXYGEN SATURATION: 99 % | HEIGHT: 68 IN | RESPIRATION RATE: 16 BRPM | BODY MASS INDEX: 25.16 KG/M2 | TEMPERATURE: 97.7 F

## 2020-11-24 DIAGNOSIS — M79.672 LEFT FOOT PAIN: ICD-10-CM

## 2020-11-24 DIAGNOSIS — M79.672 LEFT FOOT PAIN: Primary | ICD-10-CM

## 2020-11-24 PROCEDURE — 73630 X-RAY EXAM OF FOOT: CPT

## 2020-11-24 PROCEDURE — 99213 OFFICE O/P EST LOW 20 MIN: CPT | Performed by: NURSE PRACTITIONER

## 2020-11-24 NOTE — PATIENT INSTRUCTIONS
Foot Pain  Many things can cause foot pain. Some common causes are:  · An injury.  · A sprain.  · Arthritis.  · Blisters.  · Bunions.  Follow these instructions at home:  Managing pain, stiffness, and swelling  If directed, put ice on the painful area:  · Put ice in a plastic bag.  · Place a towel between your skin and the bag.  · Leave the ice on for 20 minutes, 2-3 times a day.    Activity  · Do not stand or walk for long periods.  · Return to your normal activities as told by your health care provider. Ask your health care provider what activities are safe for you.  · Do stretches to relieve foot pain and stiffness as told by your health care provider.  · Do not lift anything that is heavier than 10 lb (4.5 kg), or the limit that you are told, until your health care provider says that it is safe. Lifting a lot of weight can put added pressure on your feet.  Lifestyle  · Wear comfortable, supportive shoes that fit you well. Do not wear high heels.  · Keep your feet clean and dry.  General instructions  · Take over-the-counter and prescription medicines only as told by your health care provider.  · Rub your foot gently.  · Pay attention to any changes in your symptoms.  · Keep all follow-up visits as told by your health care provider. This is important.  Contact a health care provider if:  · Your pain does not get better after a few days of self-care.  · Your pain gets worse.  · You cannot stand on your foot.  Get help right away if:  · Your foot is numb or tingling.  · Your foot or toes are swollen.  · Your foot or toes turn white or blue.  · You have warmth and redness along your foot.  Summary  · Common causes of foot pain are injury, sprain, arthritis, blisters or bunions.  · Ice, medicines, and comfortable shoes may help foot pain.  · Contact your health care provider if your pain does not get better after a few days of self-care.  This information is not intended to replace advice given to you by your health  care provider. Make sure you discuss any questions you have with your health care provider.  Document Revised: 10/03/2019 Document Reviewed: 10/03/2019  Elsevier Patient Education © 2020 [employer]
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
## 2021-03-24 ENCOUNTER — BULK ORDERING (OUTPATIENT)
Dept: CASE MANAGEMENT | Facility: OTHER | Age: 56
End: 2021-03-24

## 2021-03-24 DIAGNOSIS — Z23 IMMUNIZATION DUE: ICD-10-CM
